# Patient Record
(demographics unavailable — no encounter records)

---

## 2024-12-20 NOTE — ADDENDUM
[FreeTextEntry1] : Labs all good aside the following:  Vitamin D deficiency Recommend vitamin D3 2000 IU daily  Low normal B12 Start over-the-counter vitamin B12 sublingual dissolving tablet 1000 mcg daily RTO 3 months for repeat labs  Elevated A1c 6.0 consistent with persistent prediabetes for which dietary/lifestyle modifications is advised along with continued monitoring  Mild new onset leukocytosis with lymphocyte predominance of unclear significance but I do recommend a repeat CBC in the next 2 to 4 weeks and if still elevated we will need to do additional blood work

## 2024-12-20 NOTE — ASSESSMENT
[FreeTextEntry1] : EKG obtained in office today demonstrates NSR. normal axis/Intervals. Good-R-wave progression. Normal EKG.   -F/u labs drawn in office today -Further recs pending lab results -RTO yearly for CPE or sooner if needed

## 2024-12-20 NOTE — HEALTH RISK ASSESSMENT
[Reviewed no changes] : Reviewed, no changes [Excellent] : ~his/her~  mood as  excellent [No] : In the past 12 months have you used drugs other than those required for medical reasons? No [0] : 2) Feeling down, depressed, or hopeless: Not at all (0) [PHQ-2 Negative - No further assessment needed] : PHQ-2 Negative - No further assessment needed [Patient reported mammogram was normal] : Patient reported mammogram was normal [Patient reported PAP Smear was normal] : Patient reported PAP Smear was normal [Patient declined colonoscopy] : Patient declined colonoscopy [HIV test declined] : HIV test declined [Hepatitis C test declined] : Hepatitis C test declined [None] : None [With Family] : lives with family [Employed] : employed [] :  [# Of Children ___] : has [unfilled] children [Fully functional (bathing, dressing, toileting, transferring, walking, feeding)] : Fully functional (bathing, dressing, toileting, transferring, walking, feeding) [Fully functional (using the telephone, shopping, preparing meals, housekeeping, doing laundry, using] : Fully functional and needs no help or supervision to perform IADLs (using the telephone, shopping, preparing meals, housekeeping, doing laundry, using transportation, managing medications and managing finances) [Never] : Never [Audit-CScore] : 0 [[id number]] : 0 [Change in mental status noted] : No change in mental status noted [MammogramDate] : 02/24 [PapSmearDate] : 02/24 [ColonoscopyDate] : 12/24 [AdvancecareDate] : 12/24

## 2024-12-20 NOTE — HISTORY OF PRESENT ILLNESS
[FreeTextEntry1] :  annual well check and to establish care w/ a new PMD [de-identified] : -PMH: HLD, PreDM, Obesity -SH: . 2 children. Non-smoker. Occasional EtOH use.   ORACIO is a 57 year F whom is here today for an annual well check and to establish care w/ a new PMD Today, pt reports feeling well but admits to fatigue  Specialists Involved: -OBGYN: Dr. Louis -Cardio: Dr. Villalta   -Vaccines: Needs Flu, Shingles, TDap (Declines) -Mammogram: 2/2024 -Pap Smear: 2/2024 -Colonoscopy: Declines -FH of Colon, breast or ovarian CA: Paternal GM w/ Ovarian CA. Patient mom has ulcerative colitis

## 2024-12-20 NOTE — HISTORY OF PRESENT ILLNESS
[FreeTextEntry1] :  annual well check and to establish care w/ a new PMD [de-identified] : -PMH: HLD, PreDM, Obesity -SH: . 2 children. Non-smoker. Occasional EtOH use.   ORACIO is a 57 year F whom is here today for an annual well check and to establish care w/ a new PMD Today, pt reports feeling well but admits to fatigue  Specialists Involved: -OBGYN: Dr. Louis -Cardio: Dr. Villalta   -Vaccines: Needs Flu, Shingles, TDap (Declines) -Mammogram: 2/2024 -Pap Smear: 2/2024 -Colonoscopy: Declines -FH of Colon, breast or ovarian CA: Paternal GM w/ Ovarian CA. Patient mom has ulcerative colitis

## 2024-12-20 NOTE — HEALTH RISK ASSESSMENT
[Reviewed no changes] : Reviewed, no changes [Excellent] : ~his/her~  mood as  excellent [No] : In the past 12 months have you used drugs other than those required for medical reasons? No [0] : 2) Feeling down, depressed, or hopeless: Not at all (0) [PHQ-2 Negative - No further assessment needed] : PHQ-2 Negative - No further assessment needed [Patient reported mammogram was normal] : Patient reported mammogram was normal [Patient reported PAP Smear was normal] : Patient reported PAP Smear was normal [Patient declined colonoscopy] : Patient declined colonoscopy [HIV test declined] : HIV test declined [Hepatitis C test declined] : Hepatitis C test declined [None] : None [With Family] : lives with family [Employed] : employed [] :  [# Of Children ___] : has [unfilled] children [Fully functional (bathing, dressing, toileting, transferring, walking, feeding)] : Fully functional (bathing, dressing, toileting, transferring, walking, feeding) [Fully functional (using the telephone, shopping, preparing meals, housekeeping, doing laundry, using] : Fully functional and needs no help or supervision to perform IADLs (using the telephone, shopping, preparing meals, housekeeping, doing laundry, using transportation, managing medications and managing finances) [Never] : Never [Audit-CScore] : 0 [[id number]] : 0 [Change in mental status noted] : No change in mental status noted [MammogramDate] : 02/24 [PapSmearDate] : 02/24 [ColonoscopyDate] : 12/24 [AdvancecareDate] : 12/24

## 2025-02-18 NOTE — HISTORY OF PRESENT ILLNESS
[Y] : Positive pregnancy history [FreeTextEntry1] : 58-year-old -0-1-2 presents for GYN evaluation.  Denies pain bleeding or discharge. [PGHxTotal] : 3 [facility] : [facility] [PGHxPremature] : 0 [PGHxAbortions] : 1 [[facility] [PGHxABInduced] : 0 [PGHxABSpont] : 1 [PGHxEctopic] : 0 [PGHxMultBirths] : 0

## 2025-02-18 NOTE — PHYSICAL EXAM
[Chaperone Present] : A chaperone was present in the examining room during all aspects of the physical examination [[id number]] : A chaperone was present during the pelvic exam. [FreeTextEntry2] : Kena [Appropriately responsive] : appropriately responsive [Alert] : alert [No Acute Distress] : no acute distress [No Lymphadenopathy] : no lymphadenopathy [Regular Rate Rhythm] : regular rate rhythm [No Murmurs] : no murmurs [Clear to Auscultation B/L] : clear to auscultation bilaterally [Soft] : soft [Non-tender] : non-tender [Non-distended] : non-distended [No HSM] : No HSM [No Lesions] : no lesions [No Mass] : no mass [Oriented x3] : oriented x3 [Examination Of The Breasts] : a normal appearance [No Masses] : no breast masses were palpable [Vulvar Atrophy] : vulvar atrophy [Labia Majora] : normal [Labia Minora] : normal [Atrophy] : atrophy [Normal] : normal [Uterine Adnexae] : normal [Declined] : Patient declined rectal exam

## 2025-02-18 NOTE — DISCUSSION/SUMMARY
[FreeTextEntry1] : 58-year-old -0-1-2 presents for routine GYN evaluation.  Denies pain bleeding or discharge.  Physical exam shows atrophic changes.  Pap GC chlamydia sent.  Mammogram and breast sonogram ordered.  Patient to return in 1 year for follow-up and all questions answered.

## 2025-02-20 NOTE — HISTORY OF PRESENT ILLNESS
[FreeTextEntry1] :  obesity management, weight loss, HLD, PreDM [de-identified] : -PMH: HLD, PreDM, Obesity -SH: . 2 children. Non-smoker. Occasional EtOH use.   ORACIO is a 58 year F whom is here today for f/u obesity management, weight loss, HLD, PreDM Patient never started Zepbound 2.5 mg q. weekly. She states that she had a recent viral illness while she was away on vacation in the [place] Jan 17. She continues to have a fatigue & cough. Denies SOB, fever, chills, night sweats.  Since time of last visit patient has started recommended over-the-counter vitamin B12 1000 mcg daily, vitamin D3 2000 IU daily She also underwent the repeat blood work for evaluation of the slight lymphocytosis.

## 2025-02-20 NOTE — ASSESSMENT
[FreeTextEntry1] : Normal VS & PE in office today Likely postviral syndrome that should run its course. If SXs not improving or should they worsen she will RTO for re-evaluation. I do however, recommend CXR which i provided her with the script for today We reviewed her recent labs  Recommend she start the Zepbound and RTO in 6-8 weeks for f/u after starting med

## 2025-03-26 NOTE — ASSESSMENT
[FreeTextEntry1] : Ms Schoberl is a very pleasant 58 year old woman with a history of b12 deficiency who was referred to hematology for further evalution.   B12 deficiency: no available evidence of deficiency.  She will look at home and send me any documentation that shows otherwise.  Will repeat B12/folate as well as check methylmalonic acid to further evaluate and will check CBC as well.  Will call her with results and if unremarkable will have follow up in 6 months.

## 2025-03-26 NOTE — HISTORY OF PRESENT ILLNESS
[de-identified] : Ms Schoberl is a very pleasant 58 year old woman with a history of b12 deficiency who was referred to hematology for further evalution. She reports taking b12 injections for years due to a deficiency.  She reports having chronic fatigue that this was meant to address but which never improved the symptoms. She is not has not been anemic, nor does she have any documented b12 deficiency in our [facility] records dating back to 2017.  She has not had colonoscopy, but has had cologuard and is up to date with mammagraphy.

## 2025-03-26 NOTE — REVIEW OF SYSTEMS
[Patient Intake Form Reviewed] : Patient intake form was reviewed [Fever] : no fever [Recent Change In Weight] : ~T recent weight change [Eye Pain] : no eye pain [Vision Problems] : no vision problems [Dysphagia] : no dysphagia [Hoarseness] : no hoarseness [Odynophagia] : no odynophagia [Chest Pain] : no chest pain [Lower Ext Edema] : no lower extremity edema [Shortness Of Breath] : no shortness of breath [SOB on Exertion] : no shortness of breath during exertion [Abdominal Pain] : no abdominal pain [Vomiting] : no vomiting [Constipation] : no constipation [Dysuria] : no dysuria [Joint Pain] : no joint pain [Skin Rash] : no skin rash [Easy Bleeding] : no tendency for easy bleeding [Easy Bruising] : no tendency for easy bruising [Swollen Glands] : no swollen glands

## 2025-03-26 NOTE — HISTORY OF PRESENT ILLNESS
[de-identified] : Ms Schoberl is a very pleasant 58 year old woman with a history of b12 deficiency who was referred to hematology for further evalution. She reports taking b12 injections for years due to a deficiency.  She reports having chronic fatigue that this was meant to address but which never improved the symptoms. She is not has not been anemic, nor does she have any documented b12 deficiency in our [facility] records dating back to 2017.  She has not had colonoscopy, but has had cologuard and is up to date with mammagraphy.

## 2025-04-29 NOTE — ASSESSMENT
[FreeTextEntry1] : Doing well on Zepbound Increase Zepbound to 5mg qweekly C/w healthy diet and exercise. [id number] diet preferred   -F/u labs drawn in office today -Further recs pending lab results -RTO 3mo or sooner if needed

## 2025-04-29 NOTE — HISTORY OF PRESENT ILLNESS
[FreeTextEntry1] :  obesity management, weight loss, HLD, PreDM [de-identified] : -PMH: HLD, PreDM, Obesity -SH: . 2 children. Non-smoker. Occasional EtOH use.   ORACIO is a 58 year F whom is here today for f/u obesity management, weight loss, HLD, PreDM States she has been tolerating Zepbound 2.5mg qwkly well w/o N/V, abdominal pain, diarrhea and constipation. Following Heme consult she stopped VitD, B12

## 2025-07-18 NOTE — ASSESSMENT
[FreeTextEntry1] : Doing well on Zepbound Increase Zepbound to 5mg qweekly C/w healthy diet and exercise. [id number] diet preferred   -F/u labs drawn in office today -Further recs pending lab results -RTO 3mo or sooner if needed

## 2025-07-18 NOTE — ADDENDUM
[FreeTextEntry1] : Total and LDL cholesterol remain elevated Recommend Lipitor 10 mg at bedtime and RTO 3 months for repeat fasting lipid panel All other labs look good

## 2025-07-18 NOTE — HISTORY OF PRESENT ILLNESS
[FreeTextEntry1] :  obesity management, weight loss, HLD, PreDM [de-identified] : -PMH: HLD, PreDM, Obesity -SH: . 2 children. Non-smoker. Occasional EtOH use.   ORACIO is a 58 year F whom is here today for f/u obesity management, weight loss, HLD, PreDM States she has been tolerating Zepbound 5mg qwkly well w/o N/V, abdominal pain, diarrhea and constipation. also reoprts nasal congestion, postnasal drip, cough. denies fever, chills, sob

## 2025-07-18 NOTE — ASSESSMENT
[FreeTextEntry1] : Doing well on Zepbound Increase Zepbound to 5mg qweekly C/w healthy diet and exercise. [id number] diet preferred   -F/u labs drawn in office today -Further recs pending lab results -RTO 3mo or sooner if needed

## 2025-07-18 NOTE — HISTORY OF PRESENT ILLNESS
[FreeTextEntry1] :  obesity management, weight loss, HLD, PreDM [de-identified] : -PMH: HLD, PreDM, Obesity -SH: . 2 children. Non-smoker. Occasional EtOH use.   ORACIO is a 58 year F whom is here today for f/u obesity management, weight loss, HLD, PreDM States she has been tolerating Zepbound 5mg qwkly well w/o N/V, abdominal pain, diarrhea and constipation. also reoprts nasal congestion, postnasal drip, cough. denies fever, chills, sob